# Patient Record
Sex: FEMALE | URBAN - METROPOLITAN AREA
[De-identification: names, ages, dates, MRNs, and addresses within clinical notes are randomized per-mention and may not be internally consistent; named-entity substitution may affect disease eponyms.]

---

## 2021-08-02 ENCOUNTER — OFFICE VISIT (OUTPATIENT)
Dept: URBAN - METROPOLITAN AREA CLINIC 26 | Facility: CLINIC | Age: 66
End: 2021-08-02
Payer: COMMERCIAL

## 2021-08-02 PROCEDURE — 99204 OFFICE O/P NEW MOD 45 MIN: CPT | Performed by: OPHTHALMOLOGY

## 2021-08-02 PROCEDURE — 92285 EXTERNAL OCULAR PHOTOGRAPHY: CPT | Performed by: OPHTHALMOLOGY

## 2021-08-02 PROCEDURE — 68840 EXPLORE/IRRIGATE TEAR DUCTS: CPT | Performed by: OPHTHALMOLOGY

## 2021-08-02 PROCEDURE — 68810 PROBE NASOLACRIMAL DUCT: CPT | Performed by: OPHTHALMOLOGY

## 2021-08-02 NOTE — IMPRESSION/PLAN
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 Plan: irrigated today, normal outflow to nose/throat. no indication of NLDO. recommended comprehensive eye care evaluation to discuss excess tear production.

## 2021-08-05 ENCOUNTER — OFFICE VISIT (OUTPATIENT)
Dept: URBAN - METROPOLITAN AREA CLINIC 30 | Facility: CLINIC | Age: 66
End: 2021-08-05
Payer: COMMERCIAL

## 2021-08-05 DIAGNOSIS — H43.393 OTHER VITREOUS OPACITIES, BILATERAL: ICD-10-CM

## 2021-08-05 PROCEDURE — 99203 OFFICE O/P NEW LOW 30 MIN: CPT | Performed by: OPTOMETRIST

## 2021-08-05 PROCEDURE — 92134 CPTRZ OPH DX IMG PST SGM RTA: CPT | Performed by: OPTOMETRIST

## 2021-08-05 RX ORDER — PREDNISOLONE ACETATE 10 MG/ML
1 % SUSPENSION/ DROPS OPHTHALMIC
Qty: 5 | Refills: 0 | Status: INACTIVE
Start: 2021-08-05 | End: 2021-11-19

## 2021-08-05 ASSESSMENT — KERATOMETRY
OD: 40.91
OS: 40.70

## 2021-08-05 ASSESSMENT — VISUAL ACUITY
OD: 20/20
OS: 20/20

## 2021-08-05 ASSESSMENT — INTRAOCULAR PRESSURE
OD: 16
OS: 16

## 2021-08-05 NOTE — IMPRESSION/PLAN
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 Plan: Had consult with Dr. Mitchell Luna 8/2021-irrigated at that visit. normal outflow to nose/throat. no indication of NLDO. recommended comprehensive eye care evaluation to discuss excess tear production. Puncta appears more closed. Possibly consider small punctoplasty. Start PA 1% bid OU to rule out DED. Start ATs.

## 2021-11-19 ENCOUNTER — OFFICE VISIT (OUTPATIENT)
Dept: URBAN - METROPOLITAN AREA CLINIC 30 | Facility: CLINIC | Age: 66
End: 2021-11-19
Payer: COMMERCIAL

## 2021-11-19 DIAGNOSIS — H04.211: Primary | ICD-10-CM

## 2021-11-19 PROCEDURE — 99213 OFFICE O/P EST LOW 20 MIN: CPT | Performed by: OPTOMETRIST

## 2021-11-19 RX ORDER — PREDNISOLONE ACETATE 10 MG/ML
1 % SUSPENSION/ DROPS OPHTHALMIC
Qty: 5 | Refills: 0 | Status: ACTIVE
Start: 2021-11-19

## 2021-11-19 RX ORDER — CYCLOSPORINE 0.5 MG/ML
0.05 % EMULSION OPHTHALMIC
Qty: 180 | Refills: 6 | Status: ACTIVE
Start: 2021-11-19

## 2021-11-19 ASSESSMENT — INTRAOCULAR PRESSURE
OS: 15
OD: 15

## 2021-11-19 NOTE — IMPRESSION/PLAN
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 Plan: Significant intermittent tearing OD for 20-30 years. Worse outdoors and during colder months. Had consult with Dr. Pamela Knutson 8/2021-irrigated at that visit. normal outflow to nose/throat. no indication of NLDO. Puncta appears more closed. Possibly consider small punctoplasty. finished  PA 1% bid OU--noted improvement in tearing while on gtts. symptoms returned after finishing PA1%. Restart PA 1% bid OU x 6 weeks. Start Restasis bid OU. continue AT's qid OU.